# Patient Record
Sex: MALE
[De-identification: names, ages, dates, MRNs, and addresses within clinical notes are randomized per-mention and may not be internally consistent; named-entity substitution may affect disease eponyms.]

---

## 2023-05-15 ENCOUNTER — NON-APPOINTMENT (OUTPATIENT)
Age: 63
End: 2023-05-15

## 2023-05-15 ENCOUNTER — APPOINTMENT (OUTPATIENT)
Dept: PULMONOLOGY | Facility: CLINIC | Age: 63
End: 2023-05-15
Payer: MEDICAID

## 2023-05-15 VITALS
BODY MASS INDEX: 23.95 KG/M2 | OXYGEN SATURATION: 97 % | WEIGHT: 158 LBS | RESPIRATION RATE: 16 BRPM | DIASTOLIC BLOOD PRESSURE: 70 MMHG | HEIGHT: 68 IN | HEART RATE: 73 BPM | SYSTOLIC BLOOD PRESSURE: 124 MMHG

## 2023-05-15 PROBLEM — Z00.00 ENCOUNTER FOR PREVENTIVE HEALTH EXAMINATION: Status: ACTIVE | Noted: 2023-05-15

## 2023-05-15 PROCEDURE — 99203 OFFICE O/P NEW LOW 30 MIN: CPT

## 2023-05-15 NOTE — HISTORY OF PRESENT ILLNESS
[Former] : former [TextBox_4] : 63M PMH smoker who presents for initial pulmonary evaluation of abnormal CT. He had lung CA screening CT at  (03/2023) showing combined emphysema and ILD as well as 5mm RLL nodule. Had prior CT 06/2021 at [facility] that showed no nodules, and interstitial findings were present (but not reported) but to a lesser degree. He denies SOB. No chest pain. No worsening cough. No N/V/D.  [TextBox_11] : 1 [TextBox_13] : 40 [YearQuit] : 04/2023

## 2023-08-08 ENCOUNTER — APPOINTMENT (OUTPATIENT)
Dept: PULMONOLOGY | Facility: CLINIC | Age: 63
End: 2023-08-08
Payer: MEDICAID

## 2023-08-08 VITALS
RESPIRATION RATE: 16 BRPM | DIASTOLIC BLOOD PRESSURE: 72 MMHG | OXYGEN SATURATION: 97 % | SYSTOLIC BLOOD PRESSURE: 122 MMHG | HEART RATE: 70 BPM

## 2023-08-08 VITALS — HEIGHT: 67 IN | BODY MASS INDEX: 26.21 KG/M2 | WEIGHT: 167 LBS

## 2023-08-08 DIAGNOSIS — J84.9 INTERSTITIAL PULMONARY DISEASE, UNSPECIFIED: ICD-10-CM

## 2023-08-08 DIAGNOSIS — Z87.891 PERSONAL HISTORY OF NICOTINE DEPENDENCE: ICD-10-CM

## 2023-08-08 PROCEDURE — 94729 DIFFUSING CAPACITY: CPT

## 2023-08-08 PROCEDURE — 94010 BREATHING CAPACITY TEST: CPT

## 2023-08-08 PROCEDURE — 85018 HEMOGLOBIN: CPT | Mod: QW

## 2023-08-08 PROCEDURE — 94727 GAS DIL/WSHOT DETER LNG VOL: CPT

## 2023-08-08 PROCEDURE — 99214 OFFICE O/P EST MOD 30 MIN: CPT | Mod: 25

## 2023-08-08 PROCEDURE — 99406 BEHAV CHNG SMOKING 3-10 MIN: CPT

## 2023-08-08 NOTE — RESULTS/DATA
[TextEntry] : [facility] PATIENT NAME: Martin Tan PATIENT PHONE NUMBER: [phone] PATIENT ID: [id number] : 1960 DATE OF EXAM: 2023 R. Phys. Name: Cecile Garrett R. Phys. Address: [address] [place] [address] R Phys. Phone: [phone] CT-LUNG SCREENING NON CONTRAST  HISTORY: Lung cancer screening Z12.2, Smoking History Z87.891  TECHNIQUE: Noncontrast CT of the chest was performed. MIP as well as axial, sagittal and coronal reformats were also reconstructed. This study was performed using automatic exposure control (radiation dose reduction software) to obtain a diagnostic image quality scan with patient dose as low as reasonably achievable. The administered radiation dose was 1.12 mSv.  COMPARISON: None.  FINDINGS:   LOWER NECK: No discernible thyroid nodule  LUNGS AND PLEURA: There is bronchial thickening, emphysema, and bibasal interstitial disease with regions of honeycombing in the right lower lobe. There is a 5 mm nodule in the superior segment of the right lower lobe (series 9 image 165).  MEDIASTINUM AND KELSEA: No suspicious lymphadenopathy or mass.  HEART: Normal heart size. No pericardial effusion. Normal caliber of the aorta and pulmonary artery.  UPPER ABDOMEN: Unremarkable.  OSSEOUS STRUCTURES: No suspicious lesion.  IMPRESSION:    Combined emphysema and interstitial disease. 5 mm right lower lobe nodule.  LUNG RADS: Category 1- Negative-No nodules and/or definitely benign nodules.   Signed by: Eduardo Avery MD Signed Date: 3/10/2023 1:44 PM EST    SIGNED BY: Eduardo Avery M.D., Ext. [phone] 03/10/2023 01:44 PM  ~~~~~~~~~~~~~~~~~~~~~~~~~~~~~~~~~~~~~~~~~~~~~~~~~~~~~~~~~~~~~~~~~~~~~~~~

## 2023-08-08 NOTE — REVIEW OF SYSTEMS
[TextEntry] : Constitutional, HEENT, cardiovascular, respiratory, gastrointestinal, genitourinary, musculoskeletal, neurological, endocrine review of systems are otherwise negative except as noted in the history of present illness.

## 2023-08-08 NOTE — PHYSICAL EXAM
[TextEntry] : Gen - In NAD, communicating easily and in full sentences HEENT - NC/AT CV - +S1, S2, RRR Resp - CTA B/L, good inspiratory effort, non-labored breathing Ext - No pedal edema Skin - No exposed rashes or lesions Neuro - Moves all four extremities Psych - Normal affect

## 2023-08-08 NOTE — ASSESSMENT
[FreeTextEntry1] : 63M PMH smoker, ILD, emphysema who presents for f/u visit.  - PFT today showed FEV1/FVC 78.2%, FEV1 107.7%, .2%, MMEF 75/25 90%, TLC 97.7%, DLCOc 87% - This is a normal PFT - He is still smoking 3-4 cigarettes per day, but feels he can quit - Advised to check bloodwork ordered from last visit (ILD panel) - Repeat CT screening 03/2024 - He can f/u with me after CT scan then  The patient expressed understanding and agreement with the plan as outlined above, and accepts that it is their responsibility to be compliant with any recommended testing, treatment, and follow-up visits. All relevant questions and concerns were addressed.  30 minutes of time were spent on the encounter. Medical records were reviewed, including but not limited to hospital records, outpatient records, laboratory data, and diagnostic imaging studies. Greater than 50% of the face-to-face encounter time was spent on counseling and/or coordination of care.  Puma Murphy M.D. Pulmonary & Critical Care Medicine [facility] [place] [address] [place], [place] [address] T: [phone] F: [phone]

## 2023-08-08 NOTE — HISTORY OF PRESENT ILLNESS
[Current] : current [TextBox_4] : 63M PMH smoker, ILD, emphysema who presents for f/u visit. PFT today showed FEV1/FVC 78.2%, FEV1 107.7%, .2%, MMEF 75/25 90%, TLC 97.7%, DLCOc 87%. He denies SOB, denies cough. No fevers, no chills. He is still smoking 3-4 cigarettes per day, but up to 1ppd in the past. He did not have bloodwork done. [TextBox_11] : 1 [TextBox_13] : 40